# Patient Record
(demographics unavailable — no encounter records)

---

## 2024-11-20 NOTE — REASON FOR VISIT
[Behavioral Health Urgent Care Assessment] : a behavioral health urgent care assessment [School] : school [Patient] : patient [Self] : alone [Parents] : with parents [TextBox_17] : for help connecting to outpatient therapy services for presenting anxiety symptoms.

## 2024-11-20 NOTE — HISTORY OF PRESENT ILLNESS
[Not Applicable] : Not applicable [FreeTextEntry1] : Patient is a 17 year-old male, adopted near birth, domiciled with parents and sibling, enrolled in [employer], in the 12th grade regular education, with prior psychiatric history of anxiety, not currently in outpatient treatment, no h/o psychiatric hospitalizations, no h/o of self-injury or suicide attempts, no h/o aggression/violence/legal issues, no CPS involvement, no substance use, no known trauma hx, no significant pmhx, now presenting accompanied by his parents as he was referred by school for help connecting to outpatient therapy services for presenting anxiety symptoms.   As per patient, he says that he has felt anxious "as long as I can remember."  He says that this was more typically generalized worries about various things, in addition to some really struggles with self-esteem related to being overweight.  He states that while this would bother him, there were no significant incidents involving anxiety from  through seventh grade.  Towards the end of seventh grade was the COVID pandemic and upon returning to school in eighth grade, he began maturing, growing in height and losing weight, feeling more confident and social.  This continued into ninth grade, at which time he felt more self-confident, and was very busy with academics, running track, working at a [employer] school, as well as participating as a emmy .  Moving into 10th grade, his anxiety escalated, both about things in general as well as in intensifying somatic preoccupation with vomiting.  He says that as a younger child, he tended to have a smaller appetite and recalls early years in which school staff would encourage him to eat and then he threw up several times in front of peers, which was very embarrassing.  He says that this became a component of his ongoing worry, saying that since 10th grade especially, when eating meals, he will become bloated and will feel as though he could throw up.  This has gradually intensified to the point that he now does not like to eat in front of other people even at home.  He also experiences periods of high anxiety which she describes as "spiraling," occurring once every 3 to 4 months and characterized by feeling as though his throat may be closing, breathing is irregular and he will even gag because of the feeling.  He tends to feel better after using deep breathing or drinking water.  Within the past 1 to 2 weeks as well, he has been more resistant to go to school in the morning due to his anxiety.  He says that regarding mood, he does not experience persistent depressed mood, anhedonia nor does he experience thoughts of self-harm and/or suicide, recently or ever.  He did experience some more regularity of sadness during 10th grade but this is no longer the case.  He does feel irritated and situations in which his anxiety prohibits him from doing things, such as date ideas that his girlfriend brings up (e.g. 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  Regarding ADHD concerns, he says that "my mind thinks of a lot of things," which can tend to distract him.  However, he reports being able to effectively focus in school and denies symptoms of hyperactivity/impulsivity.  He is open to  whatever treatment recommendations will be helpful at this time.  Patient is a 17 year-old male, adopted near birth, domiciled with parents and sibling, enrolled in [employer], in the 12th grade regular education, with prior psychiatric history of anxiety, not currently in outpatient treatment, no h/o psychiatric hospitalizations, no h/o of self-injury or suicide attempts, no h/o aggression/violence/legal issues, no CPS involvement, no substance use, no known trauma hx, no significant pmhx, now presenting accompanied by his parents as he was referred by school for help connecting to outpatient therapy services for presenting anxiety symptoms.   Patient's parents provided collateral. School consent was obtained and contacted regarding the outcome of today's evaluation. Parents endorsed presenting anxiety symptoms pertaining to excessive worrying and over-thinking. Parents reported that the patient has difficulty with food intake as he worries about becoming ill and vomiting whenever eating food around others. Parents endorsed that the patient could go all day without eating in school and then binge eat at night. They endorsed that the patient even struggles with swallowing pills if he ever needs to take medication for any illness due to his fear of choking or vomiting. Patient even has endorsed recent somatic complaints whenever he is feeling anxious pertaining to headaches and stomach ailments. These somatic symptoms have caused the patient to leave school early at times and to be more school avoidant this school year than ever before. Patient is otherwise socially fine per the parent's report and partakes in numerous hobbies and activities. They report the patients' mood is relatively stable as evidenced by most being content/neutral despite being irritable when his stressors are high. Parents deny any symptoms of dalia or psychosis. They denied any current substance usage in the home. They denied any acute safety concerns at this time and denied any present concerns with the child as it comes to SI/HI. [FreeTextEntry2] : Patient tried outpatient therapy services for about three months last year but did not deem it beneficial.  [FreeTextEntry3] : No past psychiatric medications.

## 2024-11-20 NOTE — PLAN
[Contact was Attempted] : contact was attempted [TextBox_9] : Referral to CBT and psychiatry. return for bridging care in 2 wks [TextBox_11] : Started Zoloft 25mg x 1 wk, then INC to 50 mg PO Daily, will bridge with us until connected; Discussed risks/benefits/alternatives of medication, including risk of rapid SSRI discontinuation, as well as boxed warning for increased suicidal thinking and behavior in youth < 24 years old. Pt/parent expressed understanding. [TextBox_13] : no acute safety concerns at this time [TextBox_26] : school consent obtained and communicated with regarding the above recommendation.

## 2024-11-20 NOTE — PHYSICAL EXAM
[Normal] : normal [None] : none [Well groomed] : well groomed [Cooperative] : cooperative [Anxious] : anxious [Constricted] : constricted [Clear] : clear [Linear/Goal Directed] : linear/goal directed [Preoccupations/Ruminations] : preoccupations/ruminations [None Reported] : none reported [Average] : average [WNL] : within normal limits [Positive interaction] : positive interaction [Unremarkable/age appropriate] : unremarkable/age appropriate [FreeTextEntry1] : tall, casually dressed

## 2024-11-20 NOTE — RISK ASSESSMENT
[Clinical Interview] : Clinical Interview [Collateral Sources] : Collateral Sources [No known suicide factors] : No known suicide factors [Severe anxiety, agitation or panic] : severe anxiety, agitation or panic [Non-compliant or not receiving treatment] : non-compliant or not receiving treatment [None known] : None known [Identifies reasons for living] : identifies reasons for living [Supportive social network of family or friends] : supportive social network of family or friends [Ability to cope with stress] : ability to cope with stress [Responsibility to children, family, or others] : responsibility to children, family, or others [Engaged in work or school] : engaged in work or school [None in the patient's lifetime] : None in the patient's lifetime [None Known] : none known [No] : no [No known risk factors] : No known risk factors [Residential stability] : residential stability [Relationship stability] : relationship stability [Affective stability] : affective stability [Sobriety] : sobriety [Yes] : yes [(1) Less than once a week] : Frequency: How many times have you had these thoughts? Less than once a week [(1) Fleeting - few seconds/minutes] : Fleeting - a few seconds or minutes [(1) Easily able to control thoughts] : Easily able to control thoughts [(1) Deterrents definitely stopped you from attempting suicide] : Deterrents definitely stopped you from attempting suicide [(0) Does not apply] : Does not apply [FreeTextEntry2] : 4 [de-identified] : no access to either reported.

## 2024-11-20 NOTE — RISK ASSESSMENT
[Clinical Interview] : Clinical Interview [Collateral Sources] : Collateral Sources [No known suicide factors] : No known suicide factors [Severe anxiety, agitation or panic] : severe anxiety, agitation or panic [Non-compliant or not receiving treatment] : non-compliant or not receiving treatment [None known] : None known [Identifies reasons for living] : identifies reasons for living [Supportive social network of family or friends] : supportive social network of family or friends [Ability to cope with stress] : ability to cope with stress [Responsibility to children, family, or others] : responsibility to children, family, or others [Engaged in work or school] : engaged in work or school [None in the patient's lifetime] : None in the patient's lifetime [None Known] : none known [No] : no [No known risk factors] : No known risk factors [Residential stability] : residential stability [Relationship stability] : relationship stability [Affective stability] : affective stability [Sobriety] : sobriety [Yes] : yes [(1) Less than once a week] : Frequency: How many times have you had these thoughts? Less than once a week [(1) Fleeting - few seconds/minutes] : Fleeting - a few seconds or minutes [(1) Easily able to control thoughts] : Easily able to control thoughts [(1) Deterrents definitely stopped you from attempting suicide] : Deterrents definitely stopped you from attempting suicide [(0) Does not apply] : Does not apply [FreeTextEntry2] : 4 [de-identified] : no access to either reported.

## 2024-11-20 NOTE — DISCUSSION/SUMMARY
[Low acute suicide risk] : Low acute suicide risk [No] : No [Not clinically indicated] : Safety Plan completed/updated (for individuals at risk): Not clinically indicated [FreeTextEntry1] : Risk factors: male gender, anxiety symptoms, psychosocial stressors, not in treatment.   Protective factors: domiciled with supportive family, engaged in school, no prior SA or SIB, not current si/i/p, no h/o violence, no current hi/i/p, help-seeking, motivated for outpatient treatment, future oriented with short and long term goals, barriers to suicide, no access to guns, not acutely manic or psychotic, no substance abuse, no trauma hx, no family history of suicide.

## 2024-11-20 NOTE — HISTORY OF PRESENT ILLNESS
[Not Applicable] : Not applicable [FreeTextEntry1] : Patient is a 17 year-old male, adopted near birth, domiciled with parents and sibling, enrolled in [employer] school, in the 12th grade regular education, with prior psychiatric history of anxiety, not currently in outpatient treatment, no h/o psychiatric hospitalizations, no h/o of self-injury or suicide attempts, no h/o aggression/violence/legal issues, no CPS involvement, no substance use, no known trauma hx, no significant pmhx, now presenting accompanied by his parents as he was referred by school for help connecting to outpatient therapy services for presenting anxiety symptoms.   As per patient, he says that he has felt anxious "as long as I can remember."  He says that this was more typically generalized worries about various things, in addition to some really struggles with self-esteem related to being overweight.  He states that while this would bother him, there were no significant incidents involving anxiety from  through seventh grade.  Towards the end of seventh grade was the COVID pandemic and upon returning to school in eighth grade, he began maturing, growing in height and losing weight, feeling more confident and social.  This continued into ninth grade, at which time he felt more self-confident, and was very busy with academics, running track, working at a [employer] school, as well as participating as a emmy .  Moving into 10th grade, his anxiety escalated, both about things in general as well as in intensifying somatic preoccupation with vomiting.  He says that as a younger child, he tended to have a smaller appetite and recalls early years in which school staff would encourage him to eat and then he threw up several times in front of peers, which was very embarrassing.  He says that this became a component of his ongoing worry, saying that since 10th grade especially, when eating meals, he will become bloated and will feel as though he could throw up.  This has gradually intensified to the point that he now does not like to eat in front of other people even at home.  He also experiences periods of high anxiety which she describes as "spiraling," occurring once every 3 to 4 months and characterized by feeling as though his throat may be closing, breathing is irregular and he will even gag because of the feeling.  He tends to feel better after using deep breathing or drinking water.  Within the past 1 to 2 weeks as well, he has been more resistant to go to school in the morning due to his anxiety.  He says that regarding mood, he does not experience persistent depressed mood, anhedonia nor does he experience thoughts of self-harm and/or suicide, recently or ever.  He did experience some more regularity of sadness during 10th grade but this is no longer the case.  He does feel irritated and situations in which his anxiety prohibits him from doing things, such as date ideas that his girlfriend brings up (e.g. 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  Regarding ADHD concerns, he says that "my mind thinks of a lot of things," which can tend to distract him.  However, he reports being able to effectively focus in school and denies symptoms of hyperactivity/impulsivity.  He is open to  whatever treatment recommendations will be helpful at this time.  Patient is a 17 year-old male, adopted near birth, domiciled with parents and sibling, enrolled in [employer], in the 12th grade regular education, with prior psychiatric history of anxiety, not currently in outpatient treatment, no h/o psychiatric hospitalizations, no h/o of self-injury or suicide attempts, no h/o aggression/violence/legal issues, no CPS involvement, no substance use, no known trauma hx, no significant pmhx, now presenting accompanied by his parents as he was referred by school for help connecting to outpatient therapy services for presenting anxiety symptoms.   Patient's parents provided collateral. School consent was obtained and contacted regarding the outcome of today's evaluation. Parents endorsed presenting anxiety symptoms pertaining to excessive worrying and over-thinking. Parents reported that the patient has difficulty with food intake as he worries about becoming ill and vomiting whenever eating food around others. Parents endorsed that the patient could go all day without eating in school and then binge eat at night. They endorsed that the patient even struggles with swallowing pills if he ever needs to take medication for any illness due to his fear of choking or vomiting. Patient even has endorsed recent somatic complaints whenever he is feeling anxious pertaining to headaches and stomach ailments. These somatic symptoms have caused the patient to leave school early at times and to be more school avoidant this school year than ever before. Patient is otherwise socially fine per the parent's report and partakes in numerous hobbies and activities. They report the patients' mood is relatively stable as evidenced by most being content/neutral despite being irritable when his stressors are high. Parents deny any symptoms of dalia or psychosis. They denied any current substance usage in the home. They denied any acute safety concerns at this time and denied any present concerns with the child as it comes to SI/HI. [FreeTextEntry2] : Patient tried outpatient therapy services for about three months last year but did not deem it beneficial.  [FreeTextEntry3] : No past psychiatric medications.

## 2025-03-18 NOTE — HISTORY OF PRESENT ILLNESS
[FreeTextEntry1] : CPE [de-identified] : 18M presents for CPE. Reports he gets URIs. Denies persistent fevers, weight loss, nights sweats.

## 2025-03-18 NOTE — REVIEW OF SYSTEMS
[Fever] : no fever [Chills] : no chills [Night Sweats] : no night sweats [Recent Change In Weight] : ~T no recent weight change [Discharge] : no discharge [Vision Problems] : no vision problems [Itching] : no itching [Earache] : no earache [Nasal Discharge] : no nasal discharge [Sore Throat] : no sore throat [Chest Pain] : no chest pain [Palpitations] : no palpitations [Lower Ext Edema] : no lower extremity edema [Shortness Of Breath] : no shortness of breath [Wheezing] : no wheezing [Cough] : no cough [Dyspnea on Exertion] : not dyspnea on exertion [Abdominal Pain] : no abdominal pain [Nausea] : no nausea [Constipation] : no constipation [Diarrhea] : no diarrhea [Vomiting] : no vomiting [Melena] : no melena [Dysuria] : no dysuria [Muscle Pain] : no muscle pain [Muscle Weakness] : no muscle weakness [Skin Rash] : no skin rash [Headache] : no headache [Dizziness] : no dizziness [Suicidal] : not suicidal [Anxiety] : no anxiety [Depression] : no depression [Easy Bleeding] : no easy bleeding [Easy Bruising] : no easy bruising [Swollen Glands] : no swollen glands

## 2025-03-18 NOTE — ASSESSMENT
[FreeTextEntry1] : anxiety: controlled. On sertraline, hydroxyzine.  HCM: Check labs. Will discuss results. Denies B symptoms. Discussed checking CBC w diff, HIV.

## 2025-03-18 NOTE — HEALTH RISK ASSESSMENT
[Good] : ~his/her~  mood as  good [No] : In the past 12 months have you used drugs other than those required for medical reasons? No [0] : 2) Feeling down, depressed, or hopeless: Not at all (0) [PHQ-2 Negative - No further assessment needed] : PHQ-2 Negative - No further assessment needed [DAP1Neict] : 0 [Never] : Never [Change in mental status noted] : No change in mental status noted

## 2025-06-24 NOTE — HISTORY OF PRESENT ILLNESS
[Moderate] : moderate [___ Weeks ago] :  [unfilled] weeks ago [Constant] : constant [Cough] : cough [Sore Throat] : sore throat [Chills] : chills [Fatigue] : fatigue [Fever] : fever [Stable] : stable

## 2025-06-24 NOTE — REVIEW OF SYSTEMS
[Chest Pain] : no chest pain [Palpitations] : no palpitations [Lower Ext Edema] : no lower extremity edema [Shortness Of Breath] : shortness of breath [Cough] : cough [Dyspnea on Exertion] : not dyspnea on exertion [Abdominal Pain] : no abdominal pain [Nausea] : no nausea [Vomiting] : no vomiting [Dysuria] : no dysuria

## 2025-06-24 NOTE — ASSESSMENT
[FreeTextEntry1] : rhinosinusitis: COVID/flu negative. Lungs clear. Symptoms persistent for a week. Discussed starting doxycycline 100mg BID empirically for atypicals. start albuterol 108mcg/act 1-2 puffs q4-6hrs PRN. Follow-up if worsening.

## 2025-06-24 NOTE — PHYSICAL EXAM
[No Acute Distress] : no acute distress [Normal Sclera/Conjunctiva] : normal sclera/conjunctiva [PERRL] : pupils equal round and reactive to light [EOMI] : extraocular movements intact [No Lymphadenopathy] : no lymphadenopathy [Supple] : supple [No Respiratory Distress] : no respiratory distress  [No Accessory Muscle Use] : no accessory muscle use [Clear to Auscultation] : lungs were clear to auscultation bilaterally [Normal Rate] : normal rate  [Regular Rhythm] : with a regular rhythm [Normal S1, S2] : normal S1 and S2 [de-identified] : TM, pharyngeal erythema without exudate